# Patient Record
Sex: FEMALE | Race: WHITE | NOT HISPANIC OR LATINO | Employment: OTHER | URBAN - METROPOLITAN AREA
[De-identification: names, ages, dates, MRNs, and addresses within clinical notes are randomized per-mention and may not be internally consistent; named-entity substitution may affect disease eponyms.]

---

## 2017-01-27 ENCOUNTER — ALLSCRIPTS OFFICE VISIT (OUTPATIENT)
Dept: OTHER | Facility: OTHER | Age: 82
End: 2017-01-27

## 2017-07-06 ENCOUNTER — GENERIC CONVERSION - ENCOUNTER (OUTPATIENT)
Dept: OTHER | Facility: OTHER | Age: 82
End: 2017-07-06

## 2017-08-04 ENCOUNTER — ALLSCRIPTS OFFICE VISIT (OUTPATIENT)
Dept: OTHER | Facility: OTHER | Age: 82
End: 2017-08-04

## 2018-01-11 NOTE — PROGRESS NOTES
Assessment  Assessed    1  Atherosclerosis of native coronary artery without angina pectoris (414 01) (I25 10)   2  Atrial fibrillation (427 31) (I48 91)   3  Hypertension (401 9) (I10)   4  Encounter for current long-term use of anticoagulants (V58 61) (Z79 01)    Plan  Atrial fibrillation    · Follow-up visit in 6 months Evaluation and Treatment  Follow-up  Status: Hold For -  Scheduling  Requested for: 28Jan2016   Ordered; For: Atrial fibrillation; Ordered By: Eduar Chandler Performed:  Due: 20UIC4780    Discussion/Summary  Cardiology Discussion Summary Free Text Note Form [facility]:   Pt is cardiovascularly stable  fu pt/inr report any bleeding  fu with pcp  Patient understand the risks and benefits of anticoagulation  Target INR 2-3  Coumadin followed by her primary care physician  Patient did have cardiac catheterization in the past which showed no significant coronary artery disease  Continue all meds  Report any symptoms  All questions answered  Previous studies reviewed with patient, medications reviewed and possible side effects discussed  Continue risk factor modification  All questions answered  Safety measures reviewed  Patient advised to report any problems prompting medical attention  Return for follow up visit in 6 months or earlier if needed  Counseling Documentation With Imm: The patient, patient's family was counseled regarding diagnostic results, instructions for management, risk factor reductions, risks and benefits of treatment options, importance of compliance with treatment  Chief Complaint  Chief Complaint Free Text Note Form: Patient reports to the office for a routine cardiac follow up visit  Has history of hypertension, mitral valve disorder, history of pulmonary embolism, afib   Chief Complaint Chronic Condition [facility]: Patient is here today for follow up of chronic conditions described in HPI        History of Present Illness  Cardiology HPI Free Text Note Form [facility]: pt presents for followup visit  she states that she still has a rash, she follows with dermatology  she admits to continued sob that occurs with walking up an incline  Had cardiac cath 12/30/14- ef 60-65% normal LV  Patient denies any chest pain, heaviness  Denies any palpitations, lightheadedness, dizziness, syncope or bleeding from any site  she is taking all meds and does not need any refills  Review of Systems  Cardiology Female ROS:     Cardiac: No complaints of chest pain, no palpitations, no fainting  and as noted in HPI  Skin: No complaints of nonhealing sores or skin rash  Genitourinary: No complaints of recurrent urinary tract infections, frequent urination at night, difficult urination, blood in urine, kidney stones, loss of bladder control, kidney problems, denies any birth control or hormone replacement, is not post menopausal, not currently pregnant  Psychological: No complaints of feeling depressed, anxiety, panic attacks, or difficulty concentrating  General: No complaints of trouble sleeping, lack of energy, fatigue, appetite changes, weight changes, fever, frequent infections, or night sweats  Respiratory: No complaints of shortness of breath, cough with sputum, or wheezing  HEENT: No complaints of serious problems, hearing problems, nose problems, throat problems, or snoring  Gastrointestinal: No complaints of liver problems, nausea, vomiting, heartburn, constipation, bloody stools, diarrhea, problems swallowing, adbominal pain, or rectal bleeding  Hematologic: No complaints of bleeding disorders, anemia, blood clots, or excessive brusing  Neurological: No complaints of numbness, tingling, dizziness, weakness, seizures, headaches, syncope or fainting, AM fatigue, daytime sleepiness, no witnessed apnea episodes  Musculoskeletal: No complaints of arthritis, back pain, or painfull swelling  ROS Reviewed:   ROS reviewed  Active Problems  Problems    1   Atherosclerosis of native coronary artery without angina pectoris (414 01) (I25 10)   2  Atrial fibrillation (427 31) (I48 91)   3  Encounter for current long-term use of anticoagulants (V58 61) (Z79 01)   4  Hiatal hernia (553 3) (K44 9)   5  Hypertension (401 9) (I10)   6  Mitral valve disorder (424 0) (I05 9)   7  Pulmonary embolism (415 19) (I26 99)   8  Pulmonary Infarction (415 19)   9  Shortness of breath (786 05) (R06 02)    Past Medical History  Problems    1  History of Acute Inferolateral Myocardial Infarction (410 20)   2  History of Benign essential hypertension (401 1) (I10)   3  History of Difficulty breathing (786 09) (R06 89)   4  History of abdominal pain (V13 89) (Z87 898)   5  History of chest pain (V13 89) (Z87 898)   6  History of dizziness (V13 89) (Z87 898)   7  History of gastroesophageal reflux (GERD) (V12 79) (Z87 19)   8  History of hypercholesterolemia (V12 29) (Z86 39)   9  History of hyperlipidemia (V12 29) (Z86 39)   10  History of non-Hodgkin's lymphoma (V10 79) (Z85 72)   11  History of shortness of breath (V13 89) (Z87 898)   12  History of Inflamed seborrheic keratosis (702 11) (L82 0)   13  History of Need for prophylactic vaccination and inoculation against influenza (V04 81)    (Z23)   14  History of Palpitations (785 1) (R00 2)   15  History of Pre-operative cardiovascular examination (V72 81) (Z01 810)  Active Problems And Past Medical History Reviewed: The active problems and past medical history were reviewed and updated today  Surgical History  Problems    1  History of Cardiac Cath Procedure Outcome: Successful   2  History of Gallbladder Surgery   3  History of Hemorrhoidectomy   4  History of Ovarian Cystectomy   5  History of Tonsillectomy With Adenoidectomy   6  History of Tubal Ligation   7  History of Varicose Vein Ligation  Surgical History Reviewed: The surgical history was reviewed and updated today  Family History  Mother    1   Family history of malignant neoplasm (V16 9) (Z80 9)  Father    2  Family history of malignant neoplasm (V16 9) (Z80 9)  Family History Reviewed: The family history was reviewed and updated today  Social History  Problems    · Alcohol Use (History)   · Former smoker (V15 82) (C59 158)   · Marital History - Currently    · Never Used Drugs   · Retired  Social History Reviewed: The social history was reviewed and updated today  The social history was reviewed and is unchanged  Current Meds   1  AmLODIPine Besylate 5 MG Oral Tablet; TAKE 1 TABLET DAILY; Therapy: (Recorded:20Jan2014) to Recorded   2  Aspir-81 TBEC; TAKE 1 TABLET DAILY; Therapy: (Recorded:20Jan2014) to Recorded   3  Atenolol 50 MG Oral Tablet; TAKE 1 TABLET TWICE DAILY; Therapy: (Recorded:20Jan2014) to Recorded   4  GenTeal Mild 0 2 % Ophthalmic Solution; Therapy: (Recorded:20Jan2014) to Recorded   5  Hydrochlorothiazide 12 5 MG Oral Capsule; TAKE 1 CAPSULE DAILY; Therapy: (George Maeve) to Recorded   6  Losartan Potassium 100 MG Oral Tablet; TAKE 1 TABLET DAILY; Therapy: (George Maeve) to Recorded   7  Methenamine Mandelate 1 GM Oral Tablet; 1 PO QD; Therapy: (Wes Maeve) to Recorded   8  Tums Calcium for Life Bone CHEW; 1000MG CHEWTABS  PO QD; Therapy: (George Maeve) to Recorded   9  Warfarin Sodium 5 MG Oral Tablet; 1 PO DAILY/THURSDAY 2 5 MG; Therapy: (Recorded:20Jan2014) to Recorded  Medication List Reviewed: The medication list was reviewed and updated today  Allergies  Medication    1  No Known Drug Allergies    Vitals  Vital Signs [Data Includes: Current Encounter]    Recorded: 48TPS5874 11:17AM   Heart Rate 70   Systolic 227   Diastolic 70   Height 5 ft 5 in   Weight 167 lb    BMI Calculated 27 79   BSA Calculated 1 83   O2 Saturation 98     Physical Exam    Constitutional   General appearance: No acute distress, well appearing and well nourished     Eyes   Conjunctiva and Sclera examination: Conjunctiva pink, sclera anicteric  Ears, Nose, Mouth, and Throat - External inspection of ears and nose: Normal without deformities or discharge  Oropharynx: Clear, nares are clear, mucous membranes are moist    Neck   Neck and thyroid: Normal, supple, trachea midline, no thyromegaly  Pulmonary   Respiratory effort: No increased work of breathing or signs of respiratory distress  Auscultation of lungs: Clear to auscultation, no rales, no rhonchi, no wheezing, good air movement  Cardiovascular   Palpation of heart: Normal PMI, no thrills  Auscultation of heart: Abnormal   The rhythm was irregularly irregular  Carotid pulses: Normal, 2+ bilaterally  Peripheral vascular exam: Normal pulses throughout, no tenderness, erythema or swelling  Pedal pulses: Normal, 2+ bilaterally  Examination of extremities for edema and/or varicosities: Normal     Abdomen   Abdomen: Non-tender and no distention  Liver and spleen: No hepatomegaly or splenomegaly  Musculoskeletal Gait and station: Normal gait  Digits and nails: Normal without clubbing or cyanosis  Inspection/palpation of joints, bones, and muscles: Normal, ROM normal     Skin - Skin and subcutaneous tissue: Normal without rashes or lesions  Skin is warm and well perfused, normal turgor  Neurologic - Speech: Normal     Psychiatric - Orientation to person, place, and time: Normal  Mood and affect: Normal       Health Management  Health Maintenance   Influenza (Split); every 1 year; Last [id number]; Next Due: 83IPC0597; Overdue    Attending Note  Collaborating Physician Note: Collaborating Physician: I interviewed and examined the patient, I supervised the Advanced Practitioner and I agree with the Advanced Practitioner note  Future Appointments    Date/Time Provider Specialty Site   07/29/2016 10:00 AM JAXSON Mcdaniels   Cardiology [facility]     Signatures   Electronically signed by : Chelsey Schmitt, [facility]; Jan 28 2016 11: 40AM EST                       (Author)    Electronically signed by : JAXSON Frazier ; Jan 28 2016  7:04PM EST                       (Author)

## 2018-01-13 VITALS
OXYGEN SATURATION: 98 % | HEIGHT: 65 IN | HEART RATE: 65 BPM | WEIGHT: 168 LBS | SYSTOLIC BLOOD PRESSURE: 122 MMHG | DIASTOLIC BLOOD PRESSURE: 80 MMHG | BODY MASS INDEX: 27.99 KG/M2

## 2018-01-13 VITALS
HEART RATE: 78 BPM | WEIGHT: 170 LBS | HEIGHT: 65 IN | SYSTOLIC BLOOD PRESSURE: 122 MMHG | OXYGEN SATURATION: 98 % | DIASTOLIC BLOOD PRESSURE: 84 MMHG | BODY MASS INDEX: 28.32 KG/M2

## 2018-03-08 RX ORDER — METHENAMINE MANDELATE 1000 MG/1
TABLET, FILM COATED ORAL DAILY
COMMUNITY

## 2018-03-08 RX ORDER — WARFARIN SODIUM 5 MG/1
TABLET ORAL
Refills: 1 | COMMUNITY
Start: 2018-02-22

## 2018-03-08 RX ORDER — LOSARTAN POTASSIUM 25 MG/1
25 TABLET ORAL DAILY
Refills: 2 | COMMUNITY
Start: 2017-12-11

## 2018-03-08 RX ORDER — LOSARTAN POTASSIUM 100 MG/1
1 TABLET ORAL DAILY
COMMUNITY

## 2018-03-08 RX ORDER — AMLODIPINE BESYLATE 5 MG/1
1 TABLET ORAL DAILY
COMMUNITY

## 2018-03-08 RX ORDER — CALCIUM CARBONATE 750 MG/1
TABLET, CHEWABLE ORAL
COMMUNITY

## 2018-03-08 RX ORDER — ATENOLOL 50 MG/1
1 TABLET ORAL 2 TIMES DAILY
COMMUNITY

## 2018-03-08 RX ORDER — ASPIRIN 81 MG/1
1 TABLET ORAL DAILY
COMMUNITY

## 2018-03-08 RX ORDER — HYDROCHLOROTHIAZIDE 12.5 MG/1
12.5 CAPSULE, GELATIN COATED ORAL DAILY
Refills: 1 | COMMUNITY
Start: 2018-01-18

## 2018-03-09 ENCOUNTER — OFFICE VISIT (OUTPATIENT)
Dept: CARDIOLOGY CLINIC | Facility: CLINIC | Age: 83
End: 2018-03-09
Payer: MEDICARE

## 2018-03-09 VITALS
WEIGHT: 171.8 LBS | BODY MASS INDEX: 29.33 KG/M2 | HEIGHT: 64 IN | OXYGEN SATURATION: 97 % | DIASTOLIC BLOOD PRESSURE: 82 MMHG | HEART RATE: 79 BPM | SYSTOLIC BLOOD PRESSURE: 124 MMHG

## 2018-03-09 DIAGNOSIS — R06.02 SHORTNESS OF BREATH: ICD-10-CM

## 2018-03-09 DIAGNOSIS — I10 HYPERTENSION, ESSENTIAL: Primary | ICD-10-CM

## 2018-03-09 DIAGNOSIS — Z79.01 CHRONIC ANTICOAGULATION: ICD-10-CM

## 2018-03-09 PROCEDURE — 99213 OFFICE O/P EST LOW 20 MIN: CPT | Performed by: INTERNAL MEDICINE

## 2018-03-09 NOTE — PROGRESS NOTES
[facility] [place] [facility]  [address] [place] [id number]  Cardiology Follow Up    Sharifa Virgen  1933  [id number]      1  Hypertension, essential     2  Shortness of breath     3  Chronic anticoagulation         Chief Complaint   Patient presents with    Follow-up       Interval History:   Patient presents for follow-up visit  Patient denies any history of chest pain shortness of breath out of the ordinary  Patient denies any history of leg edema or orthopnea PND  No history of presyncope syncope  Patient states compliance with the present list of medications  Patient is on anticoagulation and INR is followed by her primary care physician  Patient Active Problem List   Diagnosis    Hypertension, essential    Shortness of breath    Chronic anticoagulation     Past Medical History:   Diagnosis Date    Atrial fibrillation (HCC)     Chest pain     Dizziness     GERD (gastroesophageal reflux disease)     Hypercholesterolemia     Hypertension     Myocardial infarction     Non Hodgkin's lymphoma (HCC)     Palpitations     SOB (shortness of breath)      Social History     Social History    Marital status: /Civil Union     Spouse name: N/A    Number of children: N/A    Years of education: N/A     Occupational History    Not on file       Social History Main Topics    Smoking status: Former Smoker    Smokeless tobacco: Never Used    Alcohol use Not on file    Drug use: No    Sexual activity: Not on file     Other Topics Concern    Not on file     Social History Narrative    No narrative on file      Family History   Problem Relation Age of Onset    Cancer Mother     Cancer Father      Past Surgical History:   Procedure Laterality Date    CARDIAC CATHETERIZATION      GALLBLADDER SURGERY      HEMORROIDECTOMY      LAPAROSCOPIC OVARIAN CYSTECTOMY      TONSILLECTOMY AND ADENOIDECTOMY      TUBAL LIGATION      VARICOSE VEIN SURGERY         Current Outpatient Prescriptions:     amLODIPine (NORVASC) 5 mg tablet, Take 1 tablet by mouth daily, Disp: , Rfl:     aspirin (ASPIR-81) 81 mg EC tablet, Take 1 tablet by mouth daily, Disp: , Rfl:     calcium carbonate (TUMS EX) 750 mg chewable tablet, Chew, Disp: , Rfl:     hydrochlorothiazide (MICROZIDE) 12 5 mg capsule, Take 12 5 mg by mouth daily, Disp: , Rfl: 1    Hypromellose (GENTEAL MILD) 0 2 % SOLN, Apply to eye, Disp: , Rfl:     losartan (COZAAR) 25 mg tablet, Take 25 mg by mouth daily, Disp: , Rfl: 2    warfarin (COUMADIN) 5 mg tablet, TAKE ONE & ONE-HALF TABLET BY MOUTH ONCE DAILY, Disp: , Rfl: 1    atenolol (TENORMIN) 50 mg tablet, Take 1 tablet by mouth 2 (two) times a day, Disp: , Rfl:     losartan (COZAAR) 100 MG tablet, Take 1 tablet by mouth daily, Disp: , Rfl:     methenamine (MANDELAMINE) 1 GM tablet, Take by mouth daily, Disp: , Rfl:   No Known Allergies    Labs:  No visits with results within 2 Month(s) from this visit  Latest known visit with results is:   No results found for any previous visit  Imaging: No results found      Review of Systems:  Review of Systems   REVIEW OF SYSTEMS:  Constitutional:  Denies fever or chills   Eyes:  Denies change in visual acuity   HENT:  Denies nasal congestion or sore throat   Respiratory:  Denies cough or shortness of breath   Cardiovascular:  Denies chest pain or edema   GI:  Denies abdominal pain, nausea, vomiting, bloody stools or diarrhea   :  Denies dysuria, frequency, difficulty in micturition and nocturia  Musculoskeletal:  Denies back pain or joint pain   Neurologic:  Denies headache, focal weakness or sensory changes   Endocrine:  Denies polyuria or polydipsia   Lymphatic:  Denies swollen glands   Psychiatric:  Denies depression or anxiety     Physical Exam:    /82   Pulse 79   Ht 5' 4" (1 626 m)   Wt 77 9 kg (171 lb 12 8 oz)   SpO2 97%   BMI 29 49 kg/m²     Physical Exam   PHYSICAL EXAM:  General:  Patient is not in acute distress   Head: Normocephalic, Atraumatic  HEENT:  Both pupils normal-size atraumatic, normocephalic, nonicteric  Neck:  JVP not raised  Trachea central  No carotid bruit  Respiratory:  normal breath sounds no crackles  no rhonchi  Cardiovascular:  Regular rate and rhythm no S3 no murmurs  GI:  Abdomen soft nontender  No organomegaly  Lymphatic:  No cervical or inguinal lymphadenopathy  Neurologic:  Patient is awake alert, oriented   Grossly nonfocal    Discussion/Summary:  Patient with multiple medical problems who seems to be doing reasonably well from cardiac standpoint  Previous studies reviewed with patient  Medications reviewed and possible side effects discussed  concepts of cardiovascular disease , signs and symptoms of heart disease  Dietary and risk factor modification reinforced  All questions answered  Safety measures reviewed  Patient advised to report any problems prompting medical attention  Patient understands the risks and benefits of anticoagulation  Target INR 2 to 3  Follow-up in 6 months  Follow-up with primary care physician

## 2018-09-14 ENCOUNTER — OFFICE VISIT (OUTPATIENT)
Dept: CARDIOLOGY CLINIC | Facility: CLINIC | Age: 83
End: 2018-09-14
Payer: MEDICARE

## 2018-09-14 VITALS
DIASTOLIC BLOOD PRESSURE: 70 MMHG | HEART RATE: 51 BPM | BODY MASS INDEX: 28.17 KG/M2 | OXYGEN SATURATION: 97 % | WEIGHT: 165 LBS | SYSTOLIC BLOOD PRESSURE: 132 MMHG | HEIGHT: 64 IN

## 2018-09-14 DIAGNOSIS — R06.02 SHORTNESS OF BREATH: ICD-10-CM

## 2018-09-14 DIAGNOSIS — Z79.01 CHRONIC ANTICOAGULATION: ICD-10-CM

## 2018-09-14 DIAGNOSIS — I10 HYPERTENSION, ESSENTIAL: Primary | ICD-10-CM

## 2018-09-14 PROCEDURE — 99213 OFFICE O/P EST LOW 20 MIN: CPT | Performed by: INTERNAL MEDICINE

## 2018-09-14 RX ORDER — SERTRALINE HYDROCHLORIDE 25 MG/1
50 TABLET, FILM COATED ORAL DAILY
COMMUNITY

## 2018-09-14 NOTE — PROGRESS NOTES
[facility]  [address]  [facility] [id number]  Cardiology Follow Up    Carlita Batista  1933  [id number]      1  Hypertension, essential     2  Shortness of breath     3  Chronic anticoagulation         Chief Complaint   Patient presents with    Follow-up       Interval History:  Patient presents for follow-up visit  Patient denies any history of chest pain shortness of breath  Patient denies any history of leg edema or orthopnea PND  No history of presyncope syncope  Patient states compliance with the present list of medications  Patient is on chronic anticoagulation followed by primary care physician  Patient Active Problem List   Diagnosis    Hypertension, essential    Shortness of breath    Chronic anticoagulation     Past Medical History:   Diagnosis Date    Atrial fibrillation (HCC)     Chest pain     Dizziness     GERD (gastroesophageal reflux disease)     Hypercholesterolemia     Hypertension     Myocardial infarction (HCC)     Non Hodgkin's lymphoma ([address] )     Palpitations     SOB (shortness of breath)      Social History     Social History    Marital status: /Civil Union     Spouse name: N/A    Number of children: N/A    Years of education: N/A     Occupational History    Not on file       Social History Main Topics    Smoking status: Former Smoker    Smokeless tobacco: Never Used    Alcohol use Not on file    Drug use: No    Sexual activity: Not on file     Other Topics Concern    Not on file     Social History Narrative    No narrative on file      Family History   Problem Relation Age of Onset    Cancer Mother     Cancer Father      Past Surgical History:   Procedure Laterality Date    CARDIAC CATHETERIZATION      GALLBLADDER SURGERY      HEMORROIDECTOMY      LAPAROSCOPIC OVARIAN CYSTECTOMY      TONSILLECTOMY AND ADENOIDECTOMY      TUBAL LIGATION      VARICOSE VEIN SURGERY         Current Outpatient Prescriptions:    amLODIPine (NORVASC) 5 mg tablet, Take 1 tablet by mouth daily, Disp: , Rfl:     aspirin (ASPIR-81) 81 mg EC tablet, Take 1 tablet by mouth daily, Disp: , Rfl:     atenolol (TENORMIN) 50 mg tablet, Take 1 tablet by mouth 2 (two) times a day, Disp: , Rfl:     calcium carbonate (TUMS EX) 750 mg chewable tablet, Chew, Disp: , Rfl:     losartan (COZAAR) 25 mg tablet, Take 25 mg by mouth daily, Disp: , Rfl: 2    sertraline (ZOLOFT) 25 mg tablet, Take 50 mg by mouth daily, Disp: , Rfl:     warfarin (COUMADIN) 5 mg tablet, TAKE ONE & ONE-HALF TABLET BY MOUTH ONCE DAILY, Disp: , Rfl: 1    hydrochlorothiazide (MICROZIDE) 12 5 mg capsule, Take 12 5 mg by mouth daily, Disp: , Rfl: 1    Hypromellose (GENTEAL MILD) 0 2 % SOLN, Apply to eye, Disp: , Rfl:     losartan (COZAAR) 100 MG tablet, Take 1 tablet by mouth daily, Disp: , Rfl:     methenamine (MANDELAMINE) 1 GM tablet, Take by mouth daily, Disp: , Rfl:   No Known Allergies    Labs:  No visits with results within 2 Month(s) from this visit  Latest known visit with results is:   No results found for any previous visit  Imaging: No results found      Review of Systems:  Review of Systems   REVIEW OF SYSTEMS:  Constitutional:  Denies fever or chills   Eyes:  Denies change in visual acuity   HENT:  Denies nasal congestion or sore throat   Respiratory:  Denies cough or shortness of breath   Cardiovascular:  Denies chest pain or edema   GI:  Denies abdominal pain, nausea, vomiting, bloody stools or diarrhea   :  Denies dysuria, frequency, difficulty in micturition and nocturia  Musculoskeletal:  Denies back pain or joint pain   Neurologic:  Denies headache, focal weakness or sensory changes   Endocrine:  Denies polyuria or polydipsia   Lymphatic:  Denies swollen glands   Psychiatric:  Denies depression or anxiety   Physical Exam:    /70   Pulse (!) 51   Ht 5' 4" (1 626 m)   Wt 74 8 kg (165 lb)   SpO2 97%   BMI 28 32 kg/m²     Physical Exam   PHYSICAL EXAM:  General:  Patient is not in acute distress   Head: Normocephalic, Atraumatic  HEENT:  Both pupils normal-size atraumatic, normocephalic, nonicteric  Neck:  JVP not raised  Trachea central  No carotid bruit  Respiratory:  normal breath sounds no crackles  no rhonchi  Cardiovascular:  Regular rate and rhythm no S3 no murmurs  GI:  Abdomen soft nontender  No organomegaly  Lymphatic:  No cervical or inguinal lymphadenopathy  Neurologic:  Patient is awake alert, oriented   Grossly nonfocal    Discussion/Summary:  Patient with multiple medical problems who seems to be doing reasonably well from cardiac standpoint  Previous studies reviewed with patient  Medications reviewed and possible side effects discussed  concepts of cardiovascular disease , signs and symptoms of heart disease  Dietary and risk factor modification reinforced  All questions answered  Safety measures reviewed  Patient advised to report any problems prompting medical attention  Patient has had cardiac catheterization in the past which was negative for any significant coronary artery disease  Patient will continue anticoagulation and follow up with primary care physician  Medications reviewed  Follow-up in 6 months

## 2024-06-04 ENCOUNTER — TELEPHONE (OUTPATIENT)
Dept: SURGERY | Facility: CLINIC | Age: 89
End: 2024-06-04